# Patient Record
Sex: MALE | Race: WHITE | NOT HISPANIC OR LATINO | Employment: OTHER | URBAN - METROPOLITAN AREA
[De-identification: names, ages, dates, MRNs, and addresses within clinical notes are randomized per-mention and may not be internally consistent; named-entity substitution may affect disease eponyms.]

---

## 2021-01-06 ENCOUNTER — HISTORICAL (OUTPATIENT)
Dept: CARDIOLOGY | Facility: HOSPITAL | Age: 66
End: 2021-01-06

## 2023-09-11 DIAGNOSIS — R91.1 LUNG NODULE: Primary | ICD-10-CM

## 2023-09-29 ENCOUNTER — HOSPITAL ENCOUNTER (OUTPATIENT)
Dept: RADIOLOGY | Facility: HOSPITAL | Age: 68
Discharge: HOME | End: 2023-09-29
Attending: INTERNAL MEDICINE
Payer: OTHER GOVERNMENT

## 2023-09-29 DIAGNOSIS — R91.1 LUNG NODULE: ICD-10-CM

## 2023-09-29 LAB
CREAT SERPL-MCNC: 1 MG/DL (ref 0.5–1.4)
SAMPLE: NORMAL

## 2023-09-29 PROCEDURE — 25500020 PHARM REV CODE 255

## 2023-09-29 PROCEDURE — 71260 CT THORAX DX C+: CPT | Mod: TC

## 2023-09-29 RX ADMIN — IOPAMIDOL 100 ML: 755 INJECTION, SOLUTION INTRAVENOUS at 04:09

## 2024-04-15 ENCOUNTER — HOSPITAL ENCOUNTER (OUTPATIENT)
Facility: HOSPITAL | Age: 69
Discharge: HOME | End: 2024-04-15
Attending: INTERNAL MEDICINE | Admitting: INTERNAL MEDICINE
Payer: OTHER GOVERNMENT

## 2024-04-15 DIAGNOSIS — R94.8 ABNORMAL PET SCAN, MEDIASTINUM: ICD-10-CM

## 2024-04-15 DIAGNOSIS — R07.9 CHEST PAIN: ICD-10-CM

## 2024-04-15 DIAGNOSIS — I25.10 CAD (CORONARY ARTERY DISEASE): ICD-10-CM

## 2024-04-15 LAB
OHS QRS DURATION: 96 MS
OHS QRS DURATION: 96 MS
OHS QTC CALCULATION: 418 MS
OHS QTC CALCULATION: 424 MS

## 2024-04-15 PROCEDURE — 27201423 OPTIME MED/SURG SUP & DEVICES STERILE SUPPLY: Performed by: INTERNAL MEDICINE

## 2024-04-15 PROCEDURE — C1769 GUIDE WIRE: HCPCS | Performed by: INTERNAL MEDICINE

## 2024-04-15 PROCEDURE — 25500020 PHARM REV CODE 255: Performed by: INTERNAL MEDICINE

## 2024-04-15 PROCEDURE — 92920 PRQ TRLUML C ANGIOP 1ART&/BR: CPT | Mod: LC | Performed by: INTERNAL MEDICINE

## 2024-04-15 PROCEDURE — C1894 INTRO/SHEATH, NON-LASER: HCPCS | Performed by: INTERNAL MEDICINE

## 2024-04-15 PROCEDURE — 25000003 PHARM REV CODE 250: Performed by: INTERNAL MEDICINE

## 2024-04-15 PROCEDURE — C1725 CATH, TRANSLUMIN NON-LASER: HCPCS | Performed by: INTERNAL MEDICINE

## 2024-04-15 PROCEDURE — 63600175 PHARM REV CODE 636 W HCPCS: Performed by: INTERNAL MEDICINE

## 2024-04-15 PROCEDURE — 93010 ELECTROCARDIOGRAM REPORT: CPT | Mod: ,,, | Performed by: INTERNAL MEDICINE

## 2024-04-15 PROCEDURE — C1887 CATHETER, GUIDING: HCPCS | Performed by: INTERNAL MEDICINE

## 2024-04-15 PROCEDURE — 93454 CORONARY ARTERY ANGIO S&I: CPT | Mod: 59 | Performed by: INTERNAL MEDICINE

## 2024-04-15 PROCEDURE — 99153 MOD SED SAME PHYS/QHP EA: CPT | Performed by: INTERNAL MEDICINE

## 2024-04-15 PROCEDURE — 99152 MOD SED SAME PHYS/QHP 5/>YRS: CPT | Performed by: INTERNAL MEDICINE

## 2024-04-15 PROCEDURE — 93005 ELECTROCARDIOGRAM TRACING: CPT

## 2024-04-15 RX ORDER — NITROGLYCERIN 20 MG/100ML
INJECTION INTRAVENOUS
Status: DISCONTINUED | OUTPATIENT
Start: 2024-04-15 | End: 2024-04-15 | Stop reason: HOSPADM

## 2024-04-15 RX ORDER — LIDOCAINE HYDROCHLORIDE 10 MG/ML
INJECTION, SOLUTION EPIDURAL; INFILTRATION; INTRACAUDAL; PERINEURAL
Status: DISCONTINUED | OUTPATIENT
Start: 2024-04-15 | End: 2024-04-15 | Stop reason: HOSPADM

## 2024-04-15 RX ORDER — HYDROCODONE BITARTRATE AND ACETAMINOPHEN 5; 325 MG/1; MG/1
1 TABLET ORAL EVERY 4 HOURS PRN
Status: DISCONTINUED | OUTPATIENT
Start: 2024-04-15 | End: 2024-04-15 | Stop reason: HOSPADM

## 2024-04-15 RX ORDER — DIAZEPAM 5 MG/1
10 TABLET ORAL
Status: DISCONTINUED | OUTPATIENT
Start: 2024-04-15 | End: 2024-04-15 | Stop reason: HOSPADM

## 2024-04-15 RX ORDER — OFLOXACIN 3 MG/ML
SOLUTION/ DROPS OPHTHALMIC
COMMUNITY
Start: 2023-12-13

## 2024-04-15 RX ORDER — HYDRALAZINE HYDROCHLORIDE 20 MG/ML
10 INJECTION INTRAMUSCULAR; INTRAVENOUS EVERY 4 HOURS PRN
Status: DISCONTINUED | OUTPATIENT
Start: 2024-04-15 | End: 2024-04-15 | Stop reason: HOSPADM

## 2024-04-15 RX ORDER — SODIUM CHLORIDE 9 MG/ML
INJECTION, SOLUTION INTRAVENOUS ONCE
Status: COMPLETED | OUTPATIENT
Start: 2024-04-15 | End: 2024-04-15

## 2024-04-15 RX ORDER — FENTANYL CITRATE 50 UG/ML
INJECTION, SOLUTION INTRAMUSCULAR; INTRAVENOUS
Status: DISCONTINUED | OUTPATIENT
Start: 2024-04-15 | End: 2024-04-15 | Stop reason: HOSPADM

## 2024-04-15 RX ORDER — DICYCLOMINE HYDROCHLORIDE 20 MG/1
20 TABLET ORAL
COMMUNITY
Start: 2023-08-28

## 2024-04-15 RX ORDER — MORPHINE SULFATE 4 MG/ML
2 INJECTION, SOLUTION INTRAMUSCULAR; INTRAVENOUS EVERY 4 HOURS PRN
Status: DISCONTINUED | OUTPATIENT
Start: 2024-04-15 | End: 2024-04-15 | Stop reason: HOSPADM

## 2024-04-15 RX ORDER — ACETAMINOPHEN 325 MG/1
650 TABLET ORAL EVERY 4 HOURS PRN
Status: DISCONTINUED | OUTPATIENT
Start: 2024-04-15 | End: 2024-04-15 | Stop reason: HOSPADM

## 2024-04-15 RX ORDER — ONDANSETRON HYDROCHLORIDE 2 MG/ML
4 INJECTION, SOLUTION INTRAVENOUS EVERY 8 HOURS PRN
Status: DISCONTINUED | OUTPATIENT
Start: 2024-04-15 | End: 2024-04-15 | Stop reason: HOSPADM

## 2024-04-15 RX ORDER — PREDNISOLONE ACETATE 10 MG/ML
SUSPENSION/ DROPS OPHTHALMIC
COMMUNITY
Start: 2023-12-29

## 2024-04-15 RX ORDER — SODIUM CHLORIDE 9 MG/ML
INJECTION, SOLUTION INTRAVENOUS CONTINUOUS
Status: DISCONTINUED | OUTPATIENT
Start: 2024-04-15 | End: 2024-04-15 | Stop reason: HOSPADM

## 2024-04-15 RX ORDER — HEPARIN SODIUM 1000 [USP'U]/ML
INJECTION, SOLUTION INTRAVENOUS; SUBCUTANEOUS
Status: DISCONTINUED | OUTPATIENT
Start: 2024-04-15 | End: 2024-04-15 | Stop reason: HOSPADM

## 2024-04-15 RX ORDER — KETOROLAC TROMETHAMINE 4 MG/ML
SOLUTION/ DROPS OPHTHALMIC
COMMUNITY
Start: 2024-01-16

## 2024-04-15 RX ORDER — DIPHENHYDRAMINE HCL 25 MG
50 CAPSULE ORAL
Status: DISCONTINUED | OUTPATIENT
Start: 2024-04-15 | End: 2024-04-15 | Stop reason: HOSPADM

## 2024-04-15 RX ORDER — DIPHENHYDRAMINE HYDROCHLORIDE 50 MG/ML
INJECTION INTRAMUSCULAR; INTRAVENOUS
Status: DISCONTINUED | OUTPATIENT
Start: 2024-04-15 | End: 2024-04-15 | Stop reason: HOSPADM

## 2024-04-15 RX ORDER — COLCHICINE 0.6 MG/1
1 TABLET ORAL EVERY OTHER DAY
COMMUNITY
Start: 2024-02-09

## 2024-04-15 RX ORDER — MIDAZOLAM HYDROCHLORIDE 1 MG/ML
INJECTION, SOLUTION INTRAMUSCULAR; INTRAVENOUS
Status: DISCONTINUED | OUTPATIENT
Start: 2024-04-15 | End: 2024-04-15 | Stop reason: HOSPADM

## 2024-04-15 RX ADMIN — SODIUM CHLORIDE: 9 INJECTION, SOLUTION INTRAVENOUS at 06:04

## 2024-04-15 NOTE — Clinical Note
The catheter was inserted into the and was inserted over the wire into the ostium   left main. Hemodynamics were performed.

## 2024-04-15 NOTE — Clinical Note
The catheter was inserted into the and was inserted over the wire into the aorta. Hemodynamics were performed.

## 2024-04-17 VITALS
OXYGEN SATURATION: 95 % | BODY MASS INDEX: 25.31 KG/M2 | HEART RATE: 52 BPM | DIASTOLIC BLOOD PRESSURE: 84 MMHG | WEIGHT: 170.88 LBS | RESPIRATION RATE: 24 BRPM | SYSTOLIC BLOOD PRESSURE: 151 MMHG | TEMPERATURE: 98 F | HEIGHT: 69 IN

## 2025-07-29 DIAGNOSIS — I73.9 PAD (PERIPHERAL ARTERY DISEASE): Primary | ICD-10-CM

## 2025-07-30 RX ORDER — BENZONATATE 100 MG/1
100 CAPSULE ORAL
COMMUNITY
Start: 2025-04-12

## 2025-07-30 RX ORDER — LORATADINE 10 MG/1
10 TABLET ORAL NIGHTLY
COMMUNITY
Start: 2025-04-12 | End: 2025-08-07

## 2025-07-30 RX ORDER — FLUTICASONE PROPIONATE 50 MCG
SPRAY, SUSPENSION (ML) NASAL
COMMUNITY
Start: 2025-04-12 | End: 2025-08-07

## 2025-08-04 ENCOUNTER — HOSPITAL ENCOUNTER (EMERGENCY)
Facility: HOSPITAL | Age: 70
Discharge: HOME | End: 2025-08-04
Attending: EMERGENCY MEDICINE
Payer: OTHER GOVERNMENT

## 2025-08-04 VITALS
SYSTOLIC BLOOD PRESSURE: 155 MMHG | HEART RATE: 62 BPM | OXYGEN SATURATION: 100 % | RESPIRATION RATE: 18 BRPM | HEIGHT: 68 IN | WEIGHT: 152 LBS | DIASTOLIC BLOOD PRESSURE: 72 MMHG | BODY MASS INDEX: 23.04 KG/M2 | TEMPERATURE: 96 F

## 2025-08-04 DIAGNOSIS — I70.209 ATHEROSCLEROSIS OF ARTERIES OF EXTREMITIES: Primary | ICD-10-CM

## 2025-08-04 DIAGNOSIS — L25.9 CONTACT DERMATITIS, UNSPECIFIED CONTACT DERMATITIS TYPE, UNSPECIFIED TRIGGER: Primary | ICD-10-CM

## 2025-08-04 PROCEDURE — 99284 EMERGENCY DEPT VISIT MOD MDM: CPT | Mod: 25

## 2025-08-04 PROCEDURE — 63600175 PHARM REV CODE 636 W HCPCS

## 2025-08-04 PROCEDURE — 96372 THER/PROPH/DIAG INJ SC/IM: CPT

## 2025-08-04 RX ORDER — HYDROCORTISONE 1 %
CREAM (GRAM) TOPICAL 2 TIMES DAILY
Qty: 112 G | Refills: 0 | Status: SHIPPED | OUTPATIENT
Start: 2025-08-04 | End: 2025-08-11

## 2025-08-04 RX ORDER — DEXAMETHASONE SODIUM PHOSPHATE 4 MG/ML
8 INJECTION, SOLUTION INTRA-ARTICULAR; INTRALESIONAL; INTRAMUSCULAR; INTRAVENOUS; SOFT TISSUE
Status: COMPLETED | OUTPATIENT
Start: 2025-08-04 | End: 2025-08-04

## 2025-08-04 RX ORDER — HYDROXYZINE HYDROCHLORIDE 25 MG/1
25 TABLET, FILM COATED ORAL EVERY 6 HOURS
Qty: 20 TABLET | Refills: 0 | Status: SHIPPED | OUTPATIENT
Start: 2025-08-04 | End: 2025-08-09

## 2025-08-04 RX ORDER — PREDNISONE 20 MG/1
40 TABLET ORAL DAILY
Qty: 10 TABLET | Refills: 0 | Status: SHIPPED | OUTPATIENT
Start: 2025-08-04 | End: 2025-08-09

## 2025-08-04 RX ADMIN — DEXAMETHASONE SODIUM PHOSPHATE 8 MG: 4 INJECTION, SOLUTION INTRA-ARTICULAR; INTRALESIONAL; INTRAMUSCULAR; INTRAVENOUS; SOFT TISSUE at 12:08

## 2025-08-04 NOTE — PROGRESS NOTES
"    [facility] Vascular - Clinic Note  João Farris MD      Patient Name: Tjeas Chan                   : 1955      MRN: [id number]   Visit Date: 2025       History Present Illness     Reason for Visit: Claudication    Mr. Chan presents to the clinic for evaluation lower extremity arterial disease and consideration for possible right ilatpzk-cl-lohffexpa bypass. He is s/p right lower extremity angiogram 25 via pedal access with Dr. Nichols. He has a history of aortobifemoral bypass by Dr. Barbour but he is unsure of when.     to bilateral lower extremities and denies one leg being worse than the other. He reports he is able to walk as far as he needs without much discomfort. - at least 2000 feet. He reports intermittent swelling and numbness in feet (doesn't occur daily), right foot worse than left foot. He denies discoloration or wounds. He reports worsening cramping at rest. He states cramps last 20-30 seconds.     He is a former smoker and quit 14 years ago.      REVIEW OF SYSTEMS:  Review of systems conducted, negative except as stated in the history of present illness. See HPI for details.        Physical Exam      Vitals:    25 0824 25 0825   BP: (!) 171/92 (!) 174/93   BP Location: Left arm Right arm   Patient Position: Sitting Sitting   Pulse: (!) 52 (!) 54   Weight: 70.8 kg (156 lb)    Height: 5' 8" (1.727 m)           General: well-nourished, no acute distress, and healthy appearing, ambulating normally, alert, pleasant, conversant, and oriented  Neurologic: cranial nerves are grossly intact, no neurologic deficits, no motor deficits, and no sensory deficits  HEENT: grossly normal and no scleral icterus  Neck/Chest: normal  and soft without lymphadenopathy  Respiratory: breathing easily and without respiratory distress  Abdomen: normal and soft  Cardiology: regular rate and rhythm    Upper Extremity Arterial Exam:   Right - radial is palpable  Left - radial is " palpable    Lower Extremity Arterial Exam:  Right - posterior tibial is palpable and dorsalis pedis is palpable  Left - posterior tibial is palpable and dorsalis pedis is non-palpable    Musculoskeletal:   Upper Extremity: normal bilateral hand function  Lower Extremity: no edema present to bilateral lower extremities; surgical scar to left medial lower thigh across the knee            Assessment and Plan     Mr. Chan is a 70 y.o. man who has lower extremity arterial disease without significant claudication. I personally reviewed his most recent peripheral angiogram which demonstrates right superficial femoral artery occlusion with reconstitution via large collaterals at the distal SFA/proximal popliteal level.   He does have anatomy that is appropriate for a right femoral to popliteal bypass however he is not having any significant symptoms of arterial insufficiency in that extremity at this time.   He denies any regular claudication symptoms and his lifestyle is not limited by his current walking distance.  He has an easily palpable right dorsalis pedis pulse.    I educated him on symptoms of arterial insufficiency and told him to contact us if problems arise.  If he develops impactful symptoms, we will be glad to proceed with sdntwyb-ac-bctbvwptn bypass.    Will have him follow up in 6 months to evaluate his symptoms and progression of disease - will continue to watch alongside Dr. Nichols.  Continue aspirin and statin therapy.      1. PAD (peripheral artery disease)  - Ambulatory referral/consult to Vascular Surgery            Medical History     Past Medical History:   Diagnosis Date    COPD (chronic obstructive pulmonary disease)     Coronary artery disease     Hepatitis B     HLD (hyperlipidemia)     Hypertension     PAD (peripheral artery disease)     Psoriasis     S/P drug eluting coronary stent placement      Past Surgical History:   Procedure Laterality Date    COLONOSCOPY      CORONARY ANGIOPLASTY WITH  STENT PLACEMENT      x2    LEFT HEART CATHETERIZATION Left 04/15/2024    Procedure: Left heart cath;  Surgeon: Chelsea Nichols MD;  Location: [facility] CATH LAB;  Service: Cardiology;  Laterality: Left;  LHC VIA RRA    PERIPHERAL ARTERIAL STENT GRAFT      x2    PHACOEMULSIFICATION, CATARACT, WITH IOL INSERTION Left 11/30/2023    Procedure: PHACOEMULSIFICATION, CATARACT, WITH IOL INSERTION- OS;  Surgeon: Alcon Gleason MD;  Location: [facility] OR;  Service: Ophthalmology;  Laterality: Left;    PHACOEMULSIFICATION, CATARACT, WITH IOL INSERTION Right 12/28/2023    Procedure: PHACOEMULSIFICATION, CATARACT, WITH IOL INSERTION- OD;  Surgeon: Alcon Gleason MD;  Location: [facility] OR;  Service: Ophthalmology;  Laterality: Right;    SPLENECTOMY      TONSILLECTOMY       No family history on file.  Social History[1]  Current Outpatient Medications   Medication Instructions    apremilast (OTEZLA) 30 mg, 2 times daily    aspirin (ECOTRIN) 81 MG EC tablet 1 tablet, Every morning    atorvastatin (LIPITOR) 80 mg, Daily    benzonatate (TESSALON) 100 mg    colchicine (COLCRYS) 0.6 mg tablet 1 tablet, Every other day    dicyclomine (BENTYL) 20 mg    fluticasone propionate (FLONASE) 50 mcg/actuation nasal spray by Each Nostril route.    fosinopriL (MONOPRIL) 20 mg, Daily    hydrocortisone 1 % cream Topical (Top), 2 times daily, Apply to affected area 2 times daily    hydrOXYzine HCL (ATARAX) 25 mg, Oral, Every 6 hours    ketorolac 0.4% (ACULAR) 0.4 % Drop INSTILL 1 DROP BOTH EYES TWICE A DAY *DISCONTINUE DICLOFENAC EYE DROP*    loratadine (CLARITIN) 10 mg, Nightly    meloxicam (MOBIC) 15 MG tablet 1 tablet, Every morning    metoprolol tartrate (LOPRESSOR) 25 MG tablet 1 tablet, 2 times daily    ofloxacin (OCUFLOX) 0.3 % ophthalmic solution INSTILL 1 DROP IN RIGHT EYE FOUR TIMES A DAY START ON 12/26/23 AS DIRECTED    prednisoLONE acetate (PRED FORTE) 1 % DrpS INSTILL 1 DROP IN RIGHT EYE TWICE A DAY FOR 1 WEEK (START AFTER SURGERY)    predniSONE  (DELTASONE) 40 mg, Oral, Daily    ticagrelor (BRILINTA) 90 mg tablet 1 tablet, 2 times daily     Review of patient's allergies indicates:   Allergen Reactions    Clopidogrel Edema, Rash, Itching, Other (See Comments) and Swelling    Simvastatin Rash     Other Reaction(s): Unknown       Patient Care Team:  Brady Dior MD as PCP - General (Internal Medicine)  Chelsea Nichols MD as Consulting Physician (Cardiology)  João Farris MD as Vascular Surgery (Vascular Surgery)        No follow-ups on file. In addition to their scheduled follow up, the patient has also been instructed to follow up on as needed basis.     No future appointments.            [1]   Social History  Socioeconomic History    Marital status:    Tobacco Use    Smoking status: Former     Types: Vaping w/o nicotine, Cigarettes    Smokeless tobacco: Never

## 2025-08-04 NOTE — FIRST PROVIDER EVALUATION
"Medical screening examination initiated.  I have conducted a focused provider triage encounter, findings are as follows:    Brief history of present illness:  70 year old year old male presents to ER with c/o rash and swelling to bilateral hands onset yesterday. 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     Vitals:    08/04/25 1000   BP: (!) 166/74   BP Location: Left arm   Pulse: (!) 56   Resp: 20   Temp: 96.4 °F (35.8 °C)   TempSrc: Temporal   SpO2: 99%   Weight: 68.9 kg (152 lb)   Height: 5' 8" (1.727 m)       Pertinent physical exam:  awake and alert, nad    Brief workup plan:  exam     Preliminary workup initiated; this workup will be continued and followed by the physician or advanced practice provider that is assigned to the patient when roomed.  "

## 2025-08-04 NOTE — ED PROVIDER NOTES
Encounter Date: 8/4/2025       History     Chief Complaint   Patient presents with    Hand Swelling     Patient reports bilateral hand swelling and pain that he woke up with. C/o generalized pruritus last night. 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      The patient is a 70 y.o. male with a history of hypertension, hyperlipidemia, CAD, COPD, and PAD who presents to the Emergency Department with a chief complaint of rash to bilateral hands. 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 States that he did use glove but started having itching shortly after. States that this morning he woke up with rash and increased itching. Symptoms began yesterday and have been constant since onset. His pain is currently rated as a 2/10 in severity and described as itching with no radiation. Associated symptoms include itching. Symptoms are aggravated with nothing and there are no alleviating factors. The patient denies fever or chills. He denies any difficulty breathing or wheezing. He reports taking nothing prior to arrival with no relief of symptoms. No other reported symptoms at this time     The history is provided by the patient. No  was used.   Rash   This is a new problem. The current episode started today. The problem has been unchanged. The problem is associated with chemical exposure. The rash is present on the left hand and right hand. The pain is at a severity of 2/10. The pain has been Constant since onset. Associated symptoms include itching. He has tried nothing for the symptoms. The treatment provided no relief.     Review of patient's allergies indicates:   Allergen Reactions    Clopidogrel Edema, Rash, Itching, Other (See Comments) and Swelling    Simvastatin Rash     Other Reaction(s): Unknown     Past Medical History:   Diagnosis Date    COPD (chronic obstructive pulmonary disease)     Coronary artery disease     Hepatitis B     HLD (hyperlipidemia)      Hypertension     PAD (peripheral artery disease)     Psoriasis     S/P drug eluting coronary stent placement      Past Surgical History:   Procedure Laterality Date    COLONOSCOPY      CORONARY ANGIOPLASTY WITH STENT PLACEMENT      x2    LEFT HEART CATHETERIZATION Left 04/15/2024    Procedure: Left heart cath;  Surgeon: Chelsea Nichols MD;  Location: [facility] CATH LAB;  Service: Cardiology;  Laterality: Left;  LHC VIA RRA    PERIPHERAL ARTERIAL STENT GRAFT      x2    PHACOEMULSIFICATION, CATARACT, WITH IOL INSERTION Left 11/30/2023    Procedure: PHACOEMULSIFICATION, CATARACT, WITH IOL INSERTION- OS;  Surgeon: Alcon Gleason MD;  Location: [facility] OR;  Service: Ophthalmology;  Laterality: Left;    PHACOEMULSIFICATION, CATARACT, WITH IOL INSERTION Right 12/28/2023    Procedure: PHACOEMULSIFICATION, CATARACT, WITH IOL INSERTION- OD;  Surgeon: Alcon Gleason MD;  Location: [facility] OR;  Service: Ophthalmology;  Laterality: Right;    SPLENECTOMY      TONSILLECTOMY       No family history on file.  Social History[1]  Review of Systems   Constitutional:  Negative for fever.   HENT:  Negative for sore throat.    Respiratory:  Negative for shortness of breath.    Cardiovascular:  Negative for chest pain.   Gastrointestinal:  Negative for nausea.   Genitourinary:  Negative for dysuria.   Musculoskeletal:  Negative for back pain.   Skin:  Positive for itching and rash.   Neurological:  Negative for weakness.   Hematological:  Does not bruise/bleed easily.   All other systems reviewed and are negative.      Physical Exam     Initial Vitals [08/04/25 1000]   BP Pulse Resp Temp SpO2   (!) 166/74 (!) 56 20 96.4 °F (35.8 °C) 99 %      MAP       --         Physical Exam    Nursing note and vitals reviewed.  Constitutional: Vital signs are normal. He appears well-developed and well-nourished.   HENT:   Head: Normocephalic.   Right Ear: Hearing and tympanic membrane normal.   Left Ear: Hearing and tympanic membrane normal. Mouth/Throat: Uvula  is midline, oropharynx is clear and moist and mucous membranes are normal.   Eyes: Conjunctivae and EOM are normal. Pupils are equal, round, and reactive to light.   Cardiovascular:  Normal rate, regular rhythm, normal heart sounds and normal pulses.           Pulmonary/Chest: Effort normal and breath sounds normal.   Abdominal: Abdomen is soft. Bowel sounds are normal. There is no abdominal tenderness.   Musculoskeletal:      Cervical back: No spinous process tenderness or muscular tenderness.      Comments: Dry scaly rash to bilateral hands. No signs of infection.  Patient has full ROM of all fingers without difficulty.      Lymphadenopathy:     He has no cervical adenopathy.   Neurological: He is alert. GCS eye subscore is 4. GCS verbal subscore is 5. GCS motor subscore is 6.   Skin: Skin is warm, dry and intact. Capillary refill takes less than 2 seconds.         ED Course   Procedures  Labs Reviewed - No data to display       Imaging Results    None          Medications   dexAMETHasone injection 8 mg (8 mg Intramuscular Given 8/4/25 1206)     Medical Decision Making  The patient is a 70 y.o. male with a history of hypertension, hyperlipidemia, CAD, COPD, and PAD who presents to the Emergency Department with a chief complaint of rash to bilateral hands. 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 States that he did use glove but started having itching shortly after. States that this morning he woke up with rash and increased itching. Symptoms began yesterday and have been constant since onset. His pain is currently rated as a 2/10 in severity and described as itching with no radiation. Associated symptoms include itching. Symptoms are aggravated with nothing and there are no alleviating factors. The patient denies fever or chills. He reports taking nothing prior to arrival with no relief of symptoms. No other reported symptoms at this time     Judging by the patient's chief complaint and pertinent  history, the patient has the following possible differential diagnoses, including but not limited to the following.  Some of these are deemed to be lower likelihood and some more likely based on my physical exam and history combined with possible lab work and/or imaging studies.   Please see the pertinent studies, and refer to the HPI.  Some of these diagnoses will take further evaluation to fully rule out, perhaps as an outpatient and the patient was encouraged to follow up when discharged for more comprehensive evaluation.    Contact dermatitis, psoriasis     Risk  Prescription drug management.                                          Clinical Impression:  Final diagnoses:  [L25.9] Contact dermatitis, unspecified contact dermatitis type, unspecified trigger (Primary)          ED Disposition Condition    Discharge Stable          ED Prescriptions       Medication Sig Dispense Start Date End Date Auth. Provider    hydrocortisone 1 % cream Apply topically 2 (two) times daily. Apply to affected area 2 times daily for 7 days 112 g 8/4/2025 8/11/2025 Cris Flores NP    predniSONE (DELTASONE) 20 MG tablet Take 2 tablets (40 mg total) by mouth once daily. for 5 days 10 tablet 8/4/2025 8/9/2025 Cris Flores NP    hydrOXYzine HCL (ATARAX) 25 MG tablet Take 1 tablet (25 mg total) by mouth every 6 (six) hours. for 5 days 20 tablet 8/4/2025 8/9/2025 Cris Flores NP          Follow-up Information       Follow up With Specialties Details Why Contact Info    Brady Dior MD Internal Medicine   [address] [facility] [address]  [phone]                     [1]   Social History  Tobacco Use    Smoking status: Former     Types: Vaping w/o nicotine, Cigarettes    Smokeless tobacco: Never        Cris Flores NP  08/04/25 4150

## 2025-08-07 ENCOUNTER — OFFICE VISIT (OUTPATIENT)
Dept: VASCULAR SURGERY | Facility: CLINIC | Age: 70
End: 2025-08-07
Payer: OTHER GOVERNMENT

## 2025-08-07 VITALS
SYSTOLIC BLOOD PRESSURE: 174 MMHG | HEIGHT: 68 IN | DIASTOLIC BLOOD PRESSURE: 93 MMHG | WEIGHT: 156 LBS | BODY MASS INDEX: 23.64 KG/M2 | HEART RATE: 54 BPM

## 2025-08-07 DIAGNOSIS — I70.209 ATHEROSCLEROSIS OF ARTERIES OF EXTREMITIES: ICD-10-CM

## 2025-08-26 DIAGNOSIS — Z87.891 PERSONAL HISTORY OF TOBACCO USE, PRESENTING HAZARDS TO HEALTH: Primary | ICD-10-CM

## 2025-09-03 ENCOUNTER — HOSPITAL ENCOUNTER (OUTPATIENT)
Dept: RADIOLOGY | Facility: HOSPITAL | Age: 70
Discharge: HOME | End: 2025-09-03
Attending: INTERNAL MEDICINE
Payer: OTHER GOVERNMENT

## 2025-09-03 DIAGNOSIS — Z87.891 PERSONAL HISTORY OF TOBACCO USE, PRESENTING HAZARDS TO HEALTH: ICD-10-CM

## 2025-09-03 PROCEDURE — 71271 CT THORAX LUNG CANCER SCR C-: CPT | Mod: TC

## (undated) DEVICE — DRAPE ANGIO BRACH 38X44IN

## (undated) DEVICE — KIT GLIDESHEATH SLEND 6FR 10CM

## (undated) DEVICE — CONTRAST ISOVUE 370 500ML MULT

## (undated) DEVICE — CATH EMERGE MR 15 X 3.00

## (undated) DEVICE — KIT MANIFOLD LOW PRESS TUBING

## (undated) DEVICE — KIT HAND CONTROL HIGH PRESSUR

## (undated) DEVICE — VALVE CONTROL COPILOT

## (undated) DEVICE — GUIDE LAUNCHER 6FR EBU 3.5

## (undated) DEVICE — CATH EMERGE MR 20 X 3.50

## (undated) DEVICE — CANNULA DUAL CO2/O2 NASAL 7FT

## (undated) DEVICE — COVER PROBE US 5.5X58L NON LTX

## (undated) DEVICE — STOPCOCK 3-WAY

## (undated) DEVICE — Device

## (undated) DEVICE — CATH NC EMERGE MR 3X15MM

## (undated) DEVICE — DEVICE INDEFLATOR BASIX

## (undated) DEVICE — PAD DEFIB CADENCE ADULT R2

## (undated) DEVICE — GLOVE 7.5 PROTEXIS PI MICRO

## (undated) DEVICE — PACK OR CLEAN UP COMBO SIZE 2

## (undated) DEVICE — BAND TR COMP DEVICE REG 24CM

## (undated) DEVICE — CATH ANGIOSCULPT EVO 3X15MM

## (undated) DEVICE — GUIDEWIRE RUNTHROUGH EF 180CM

## (undated) DEVICE — CATH OPTITORQUE RADIAL 5FR

## (undated) DEVICE — GUIDEWIRE INQWIRE SE 3MM JTIP

## (undated) DEVICE — GUIDEWIRE STF .035X180CM ANG

## (undated) DEVICE — TUBING HP AIRLSS ROT ADPT 30IN